# Patient Record
Sex: FEMALE | Employment: PART TIME | URBAN - METROPOLITAN AREA
[De-identification: names, ages, dates, MRNs, and addresses within clinical notes are randomized per-mention and may not be internally consistent; named-entity substitution may affect disease eponyms.]

---

## 2019-11-18 ENCOUNTER — HOSPITAL ENCOUNTER (EMERGENCY)
Facility: CLINIC | Age: 49
End: 2019-11-18

## 2019-11-18 ENCOUNTER — HOSPITAL ENCOUNTER (EMERGENCY)
Facility: CLINIC | Age: 49
Discharge: HOME | End: 2019-11-18
Attending: EMERGENCY MEDICINE | Admitting: EMERGENCY MEDICINE
Payer: MEDICAID

## 2019-11-18 VITALS
DIASTOLIC BLOOD PRESSURE: 63 MMHG | TEMPERATURE: 98.3 F | HEART RATE: 91 BPM | RESPIRATION RATE: 18 BRPM | HEIGHT: 63 IN | SYSTOLIC BLOOD PRESSURE: 126 MMHG | WEIGHT: 160 LBS | OXYGEN SATURATION: 100 % | BODY MASS INDEX: 28.35 KG/M2

## 2019-11-18 DIAGNOSIS — N93.8 DYSFUNCTIONAL UTERINE BLEEDING: ICD-10-CM

## 2019-11-18 DIAGNOSIS — K04.7 DENTAL ABSCESS: ICD-10-CM

## 2019-11-18 DIAGNOSIS — D64.9 ANEMIA, UNSPECIFIED TYPE: ICD-10-CM

## 2019-11-18 LAB
ANION GAP SERPL CALCULATED.3IONS-SCNC: 6 MMOL/L (ref 3–14)
ANISOCYTOSIS BLD QL SMEAR: ABNORMAL
BASOPHILS # BLD AUTO: 0.1 10E9/L (ref 0–0.2)
BASOPHILS NFR BLD AUTO: 0.5 %
BUN SERPL-MCNC: 11 MG/DL (ref 7–30)
CALCIUM SERPL-MCNC: 9.1 MG/DL (ref 8.5–10.1)
CHLORIDE SERPL-SCNC: 106 MMOL/L (ref 94–109)
CO2 SERPL-SCNC: 24 MMOL/L (ref 20–32)
CREAT SERPL-MCNC: 0.7 MG/DL (ref 0.52–1.04)
DIFFERENTIAL METHOD BLD: ABNORMAL
ELLIPTOCYTES BLD QL SMEAR: SLIGHT
EOSINOPHIL # BLD AUTO: 0.2 10E9/L (ref 0–0.7)
EOSINOPHIL NFR BLD AUTO: 1.1 %
ERYTHROCYTE [DISTWIDTH] IN BLOOD BY AUTOMATED COUNT: 19.9 % (ref 10–15)
GFR SERPL CREATININE-BSD FRML MDRD: >90 ML/MIN/{1.73_M2}
GLUCOSE SERPL-MCNC: 104 MG/DL (ref 70–99)
HCT VFR BLD AUTO: 29.5 % (ref 35–47)
HGB BLD-MCNC: 8.2 G/DL (ref 11.7–15.7)
HYPOCHROMIA BLD QL: PRESENT
IMM GRANULOCYTES # BLD: 0.1 10E9/L (ref 0–0.4)
IMM GRANULOCYTES NFR BLD: 0.3 %
LYMPHOCYTES # BLD AUTO: 2.4 10E9/L (ref 0.8–5.3)
LYMPHOCYTES NFR BLD AUTO: 15.3 %
MCH RBC QN AUTO: 17.9 PG (ref 26.5–33)
MCHC RBC AUTO-ENTMCNC: 27.8 G/DL (ref 31.5–36.5)
MCV RBC AUTO: 64 FL (ref 78–100)
MICROCYTES BLD QL SMEAR: PRESENT
MONOCYTES # BLD AUTO: 1 10E9/L (ref 0–1.3)
MONOCYTES NFR BLD AUTO: 6.4 %
NEUTROPHILS # BLD AUTO: 11.7 10E9/L (ref 1.6–8.3)
NEUTROPHILS NFR BLD AUTO: 76.4 %
NRBC # BLD AUTO: 0 10*3/UL
NRBC BLD AUTO-RTO: 0 /100
PLATELET # BLD AUTO: 515 10E9/L (ref 150–450)
POTASSIUM SERPL-SCNC: 3.7 MMOL/L (ref 3.4–5.3)
RBC # BLD AUTO: 4.59 10E12/L (ref 3.8–5.2)
SODIUM SERPL-SCNC: 136 MMOL/L (ref 133–144)
WBC # BLD AUTO: 15.4 10E9/L (ref 4–11)

## 2019-11-18 PROCEDURE — 80048 BASIC METABOLIC PNL TOTAL CA: CPT | Performed by: EMERGENCY MEDICINE

## 2019-11-18 PROCEDURE — 41800 DRAINAGE OF GUM LESION: CPT

## 2019-11-18 PROCEDURE — 99283 EMERGENCY DEPT VISIT LOW MDM: CPT | Mod: 25

## 2019-11-18 PROCEDURE — 85025 COMPLETE CBC W/AUTO DIFF WBC: CPT | Performed by: EMERGENCY MEDICINE

## 2019-11-18 RX ORDER — PENICILLIN V POTASSIUM 500 MG/1
500 TABLET, FILM COATED ORAL 4 TIMES DAILY
Qty: 40 TABLET | Refills: 0 | Status: SHIPPED | OUTPATIENT
Start: 2019-11-18 | End: 2019-11-28

## 2019-11-18 RX ORDER — BUPIVACAINE HYDROCHLORIDE 5 MG/ML
INJECTION, SOLUTION PERINEURAL
Status: DISCONTINUED
Start: 2019-11-18 | End: 2019-11-19 | Stop reason: HOSPADM

## 2019-11-18 RX ORDER — FERROUS GLUCONATE 324(38)MG
324 TABLET ORAL
Qty: 30 TABLET | Refills: 0 | Status: SHIPPED | OUTPATIENT
Start: 2019-11-18 | End: 2019-12-18

## 2019-11-18 ASSESSMENT — ENCOUNTER SYMPTOMS
FACIAL SWELLING: 1
DIZZINESS: 1
WEAKNESS: 1

## 2019-11-18 ASSESSMENT — MIFFLIN-ST. JEOR: SCORE: 1309.89

## 2019-11-18 NOTE — ED AVS SNAPSHOT
[facility] Emergency Department  [address]  [facility] [address]  Phone:  [phone]  Fax:  [phone]                                    Génesis Jaramillo   MRN: [id number]    Department:  [facility] Emergency Department   Date of Visit:  11/18/2019           After Visit Summary Signature Page    I have received my discharge instructions, and my questions have been answered. I have discussed any challenges I see with this plan with the nurse or doctor.    ..........................................................................................................................................  Patient/Patient Representative Signature      ..........................................................................................................................................  Patient Representative Print Name and Relationship to Patient    ..................................................               ................................................  Date                                   Time    ..........................................................................................................................................  Reviewed by Signature/Title    ...................................................              ..............................................  Date                                               Time          22EPIC Rev 08/18

## 2019-11-19 NOTE — DISCHARGE INSTRUCTIONS
You had an abscess above the tooth in your upper mouth.  This was drained successfully in the ED and I will place you on antibiotics.  I have listed a number for a primary care clinic and OB/GYN clinic.  Please call this and establish care.  I have prescribed iron tablets for your anemia.  Return if you have worsening or concerning symptoms including fevers, increasing pain and swelling of the face, redness of the face, or other concerning symptoms.

## 2019-11-19 NOTE — ED PROVIDER NOTES
"  History     Chief Complaint:  Mouth Pain and Vaginal Bleeding    The history is provided by the patient and a relative. The history is limited by a language barrier (Daughter acted as an ).      Génesis Jaramillo is a 49 year old female who presents with facial swelling and vaginal bleeding. The patient reports the onset of upper gum and facial swelling one month ago. She states the pain and swelling has gotten worse. She also reports that she may have scratched her gums as she chews on ice.     Additionally, the patient endorses the onset of heavy vaginal bleeding today with associated weakness and dizziness. She states that her period had started yesterday and her last period began on 10/14. The patient states that her mother had irregular menses as well and had heavy vaginal bleeding at the start of menopause.     Allergies:  NKDA     Medications:    The patient is currently on no regular medications.      Past Medical History:    The patient denies any significant past medical history.    Past Surgical History:    The patient does not have any pertinent past surgical history  Family History:    No past pertinent family history.    Social History:  Presents with daughter  Patient is Romansh speaking  Marital Status:  Single [1]     Review of Systems   HENT: Positive for facial swelling.    Genitourinary: Positive for vaginal bleeding.   Neurological: Positive for dizziness and weakness.   All other systems reviewed and are negative.    Physical Exam     Patient Vitals for the past 24 hrs:   BP Temp Temp src Pulse Heart Rate Resp SpO2 Height Weight   11/18/19 1956 126/63 98.3  F (36.8  C) Oral 91 91 18 100 % 1.6 m (5' 3\") 72.6 kg (160 lb)     Physical Exam  Constitutional: Vital signs reviewed as above.   HEENT:    Head: No external signs of trauma. No lesions noted. There is swelling of the upper lip.   Eyes: PEERL, EOMI B/L, no pain or limitation of superior gaze. Mild subconjunctival " pallor.   Nose: Noncongested, no exudates. No rhinorrhea. No FB noted   Mouth/Throat:     Mucous membranes are moist and normal.     Intraoral abscess above the site of the left upper incisor (tooth is absent)  Neck: FROM. Neck is supple  Cardiovascular: Normal rate, regular rhythm and normal heart sounds.  No murmur heard. Equal B/L peripheral pulses.  Pulmonary/Chest: Effort normal and breath sounds normal. No respiratory distress. Patient has no wheezes. Patient has no rales.   Gastrointestinal: Soft. There is no tenderness.   Musculoskeletal/Extremities: No edema noted. Normal tone.  Neurological: Patient is alert and oriented to person, place, and time.   Skin: Skin is warm and dry. There is no diaphoresis noted.   Psychiatric: The patient appears calm.    Emergency Department Course   Laboratory:  CBC: WBC: 15.4 (H), HGB: 8.2 (L), PLT: 515 (H)  BMP: Glucose 104 (H), o/w WNL (Creatinine: 0.70)    Procedures:     Infraorbital Nerve Block   PROCEDURE:  Bilateral Infraorbital nerve block      INDICATION:  Gum Swelling    PHYSICIAN:  Gus Menchaca DO    DESCRIPTION OF PROCEDURE: Informed verbal consent.  Site correctly identified.  Using dental syringe and 5 ml of 0.5% bupivicaine , the infraorbital nerve was anesthetized using standard technique.  Patient tolerated procedure well, no complications.       Incision and Drainage   PROCEDURE:  Incision and drainage of periapical abscess    INDICATION:  Swelling and fluctuance of gum line consistent with abscess.     PHYSICIAN:  Gus Menchaca DO    DESCRIPTION OF PROCEDURE:    Informed verbal consent. Site was correctly identified.11 blade scalpel was inserted into the area of flutuance.  Marked purulence and fluid was noted.  The wound was left open.     Emergency Department Course:  Nursing notes and vitals reviewed. (2130) I performed an exam of the patient as documented above.      Medicine administered as documented above. Blood drawn. This was sent to the lab  for further testing, results above.     (2144) I performed a dental block using bupivacaine.     (2247) I performed the incision and drainage as noted in the above procedure note.     Findings and plan explained to the Patient. Patient discharged home with instructions regarding supportive care, medications, and reasons to return. The importance of close follow-up was reviewed.     I personally reviewed the laboratory results with the Patient and answered all related questions prior to discharge.        Impression & Plan    Medical Decision Making:  This 49-year-old female patient presents the ED due to vaginal bleeding as well as facial swelling.  Please see the HPI and exam for specifics.  Patient has remained well in the ED.  She is noted to be anemic.  She states that her mother had something similar when she was going through menopause.  She declined pelvic exam.  Additionally, the patient has facial swelling due to an intraoral abscess.  I was able to anesthetize the area with infraorbital nerve blocks and perform incision and drainage.  Copious amounts of purulent drainage was expressed.  The patient was given antibiotics and dental information for further follow-up.  Anticipatory guidance given prior to discharge.    Diagnosis:    ICD-10-CM    1. Dental abscess K04.7    2. Anemia, unspecified type D64.9    3. Dysfunctional uterine bleeding N93.8      Disposition:  discharged to home    Discharge Medications:  Discharge Medication List as of 11/18/2019 11:05 PM      START taking these medications    Details   ferrous gluconate (FERGON) 324 (38 Fe) MG tablet Take 1 tablet (324 mg) by mouth daily (with breakfast), Disp-30 tablet, R-0, Local Print      penicillin V (VEETID) 500 MG tablet Take 1 tablet (500 mg) by mouth 4 times daily for 10 days, Disp-40 tablet, R-0, Local Print           Scribe Disclosure:  Analia JOVEL, am serving as a scribe on 11/18/2019 at 9:12 PM to personally document services performed  by Gus Menchaca DO based on my observations and the provider's statements to me.     Analia Roberts  11/18/2019   [facility] EMERGENCY DEPARTMENT       Gus Menchaca DO  11/19/19 0135

## 2019-11-19 NOTE — ED TRIAGE NOTES
Here for upper gum and facial swelling and pain started in beginning of October and getting worse. Feverish and more tired. Thinks she may hurt herself by chewing on ice too much. Also c/o irregular menses, bleeding more than normal started yesterday. ABCs intact.